# Patient Record
Sex: MALE | URBAN - METROPOLITAN AREA
[De-identification: names, ages, dates, MRNs, and addresses within clinical notes are randomized per-mention and may not be internally consistent; named-entity substitution may affect disease eponyms.]

---

## 2018-01-09 ENCOUNTER — APPOINTMENT (RX ONLY)
Dept: URBAN - METROPOLITAN AREA CLINIC 74 | Facility: CLINIC | Age: 28
Setting detail: DERMATOLOGY
End: 2018-01-09

## 2018-01-09 DIAGNOSIS — B07.8 OTHER VIRAL WARTS: ICD-10-CM

## 2018-01-09 PROCEDURE — ? TREATMENT REGIMEN

## 2018-01-09 PROCEDURE — 99202 OFFICE O/P NEW SF 15 MIN: CPT

## 2018-01-09 PROCEDURE — ? COUNSELING

## 2018-01-09 ASSESSMENT — TOTAL NUMBER OF VERRUCA VULGARIS: # OF LESIONS?: 1

## 2018-01-09 ASSESSMENT — LOCATION DETAILED DESCRIPTION DERM: LOCATION DETAILED: RIGHT KNEE

## 2018-01-09 ASSESSMENT — LOCATION ZONE DERM: LOCATION ZONE: LEG

## 2018-01-09 ASSESSMENT — LOCATION SIMPLE DESCRIPTION DERM: LOCATION SIMPLE: RIGHT KNEE

## 2018-01-09 NOTE — HPI: WARTS (VERRUCA)
How Severe Are Your Warts?: severe
Is This A New Presentation, Or A Follow-Up?: Wart
Treatment Number (Optional): 1
Additional History: Pt has a VV on his right thigh that he has been picking at. The wart appeared about a month ago and this is pt’s first VV ever.

## 2018-01-09 NOTE — PROCEDURE: TREATMENT REGIMEN
Otc Regimen: Wart tx (ex: Dr scholls)
Plan: Pt will try otc tx first and then can call for Rx of HCA wart cream in a few months if otc does not work.
Detail Level: Zone